# Patient Record
Sex: MALE | Race: AMERICAN INDIAN OR ALASKA NATIVE
[De-identification: names, ages, dates, MRNs, and addresses within clinical notes are randomized per-mention and may not be internally consistent; named-entity substitution may affect disease eponyms.]

---

## 2017-06-11 NOTE — XRAY REPORT
FINAL REPORT



EXAM:  XR RIBS UNI W PA CHEST 3 RT



HISTORY:  rib pain 



TECHNIQUE:  Frontal view of the chest and 3 additional views the

right ribs 



PRIORS:  None.



FINDINGS:  

The cardiomediastinal silhouette appears normal. The lungs are

clear. The bones and soft tissues are unremarkable.



IMPRESSION:  

No evidence of acute cardiopulmonary disease
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
## 2017-06-11 NOTE — XRAY REPORT
FINAL REPORT



EXAM:  XR FOOT 3 LT



HISTORY:  left foot pain 



TECHNIQUE:  Three views of the left foot 



PRIORS:  None.



FINDINGS:  

There is been previous ORIF of a right distal tibia fibular

fracture and hardware is in place. Alignment is anatomic. Bones

are diffusely demineralized. There is pes planus deformity of the

foot. There are old, healed fractures of the 4th and 5th

metatarsal heads. 



IMPRESSION:  

1. Previous ankle and 4th and 5th metatarsal head fractures. 



2. Pes planus 



3. No acute findings
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
## 2020-07-04 ENCOUNTER — HOSPITAL ENCOUNTER (EMERGENCY)
Dept: HOSPITAL 5 - ED | Age: 40
Discharge: HOME | End: 2020-07-04
Payer: COMMERCIAL

## 2020-07-04 VITALS — SYSTOLIC BLOOD PRESSURE: 135 MMHG | DIASTOLIC BLOOD PRESSURE: 90 MMHG
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
## 2020-07-04 DIAGNOSIS — F17.200: ICD-10-CM

## 2020-07-04 DIAGNOSIS — Z88.6: ICD-10-CM
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
## 2020-07-04 DIAGNOSIS — S90.31XA: ICD-10-CM

## 2020-07-04 DIAGNOSIS — Z79.1: ICD-10-CM

## 2020-07-04 DIAGNOSIS — I10: ICD-10-CM

## 2020-07-04 DIAGNOSIS — Z79.899: ICD-10-CM
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
## 2020-07-04 DIAGNOSIS — S92.414A: Primary | ICD-10-CM

## 2020-07-04 DIAGNOSIS — S90.121A: ICD-10-CM

## 2020-07-04 NOTE — XRAY REPORT
XR foot 3+V RT



INDICATION / CLINICAL INFORMATION:

right foot injury.



COMPARISON:

None available.

 

FINDINGS:

BONES/JOINT(S): There is a nondisplaced fracture of the medial base of the great toe proximal phalanx
. There is no other acute fracture. No significant degenerative changes.



SOFT TISSUES: No significant abnormality.



ADDITIONAL FINDINGS: None.







Signer Name: Mukund Barbosa MD 

Signed: 7/4/2020 6:41 PM

Workstation Name: [id number]
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
